# Patient Record
Sex: MALE | Race: WHITE | HISPANIC OR LATINO | Employment: FULL TIME | URBAN - METROPOLITAN AREA
[De-identification: names, ages, dates, MRNs, and addresses within clinical notes are randomized per-mention and may not be internally consistent; named-entity substitution may affect disease eponyms.]

---

## 2019-12-20 ENCOUNTER — OFFICE VISIT (OUTPATIENT)
Dept: URGENT CARE | Facility: CLINIC | Age: 19
End: 2019-12-20
Payer: COMMERCIAL

## 2019-12-20 VITALS
HEART RATE: 104 BPM | HEIGHT: 72 IN | RESPIRATION RATE: 18 BRPM | DIASTOLIC BLOOD PRESSURE: 60 MMHG | BODY MASS INDEX: 19.1 KG/M2 | TEMPERATURE: 99.2 F | WEIGHT: 141 LBS | SYSTOLIC BLOOD PRESSURE: 110 MMHG | OXYGEN SATURATION: 99 %

## 2019-12-20 DIAGNOSIS — T14.8XXA WOUND OF SKIN: ICD-10-CM

## 2019-12-20 PROCEDURE — 99203 OFFICE O/P NEW LOW 30 MIN: CPT | Performed by: NURSE PRACTITIONER

## 2019-12-20 RX ORDER — SULFAMETHOXAZOLE AND TRIMETHOPRIM 800; 160 MG/1; MG/1
1 TABLET ORAL 2 TIMES DAILY
Qty: 14 TAB | Refills: 0 | Status: SHIPPED | OUTPATIENT
Start: 2019-12-20 | End: 2019-12-27

## 2019-12-20 SDOH — HEALTH STABILITY: MENTAL HEALTH: HOW OFTEN DO YOU HAVE A DRINK CONTAINING ALCOHOL?: NEVER

## 2019-12-20 ASSESSMENT — ENCOUNTER SYMPTOMS
FEVER: 0
CHILLS: 0
SHORTNESS OF BREATH: 0

## 2019-12-21 NOTE — PROGRESS NOTES
"Subjective:   Blane Osborne is a 19 y.o. male who presents for Wound Infection (below navel, worsened over past 2 days)        HPI   Patient with new onset wound to the left lower abdomen that started 5 days ago. States he was shaving the area and noticed a \"zit-like\" area which he popped at home. States since then he has noticed a wound has developed and with mild yellow discharge. Mild pain. Denies fever or chills. Has been keeping covered.    Review of Systems   Constitutional: Negative for chills and fever.   Respiratory: Negative for shortness of breath.    Cardiovascular: Negative for chest pain.   Skin: Negative for itching and rash.        Wound of skin     Patient's PMH, SocHx, SurgHx, FamHx, Drug allergies and medications reviewed.     Objective:   /60 (BP Location: Left arm, Patient Position: Sitting, BP Cuff Size: Adult)   Pulse (!) 104   Temp 37.3 °C (99.2 °F) (Temporal)   Resp 18   Ht 1.829 m (6')   Wt 64 kg (141 lb)   SpO2 99%   BMI 19.12 kg/m²   Physical Exam  Vitals signs reviewed.   Constitutional:       General: He is not in acute distress.     Appearance: He is well-developed. He is not diaphoretic.   HENT:      Head: Normocephalic.      Right Ear: Hearing normal.      Left Ear: Hearing normal.      Nose: Nose normal.   Eyes:      General: Lids are normal.      Conjunctiva/sclera: Conjunctivae normal.      Pupils: Pupils are equal, round, and reactive to light.   Neck:      Musculoskeletal: Normal range of motion.   Cardiovascular:      Rate and Rhythm: Normal rate.   Pulmonary:      Effort: Pulmonary effort is normal. No respiratory distress.   Skin:     General: Skin is warm.      Findings: Wound present. No rash.             Comments: Small 0.5cm circular wound to the left lower abdomen with mild surrounding erythema and mild yellow discharge noted. No area of abscess noted or red streaking.   Neurological:      Mental Status: He is alert.   Psychiatric:         Speech: " Speech normal.         Behavior: Behavior normal.         Thought Content: Thought content normal.         Judgment: Judgment normal.           Assessment/Plan:   Assessment    1. Wound of skin  - ANAEROBIC/AEROBIC/GRAM STAIN  - sulfamethoxazole-trimethoprim (BACTRIM DS) 800-160 MG tablet; Take 1 Tab by mouth 2 times a day for 7 days.  Dispense: 14 Tab; Refill: 0    Discussed to keep wound covered with polysporin and bandaid. Discussed signs/symptoms of worsening infection and when to return to the office.  Will call with wound culture results.    Differential diagnosis, natural history, supportive care, and indications for immediate follow-up discussed.     **Please note that all invasive procedures during this visit were performed by myself and/or the Medical Assistant under the supervision of the PA or MD in office**

## 2019-12-26 ENCOUNTER — TELEPHONE (OUTPATIENT)
Dept: URGENT CARE | Facility: CLINIC | Age: 19
End: 2019-12-26

## 2019-12-26 NOTE — TELEPHONE ENCOUNTER
----- Message from ANCELMO Cheek sent at 12/26/2019  1:47 PM PST -----  Regarding: FW: [facility] Results  Could you please call the patient and let him know would culture came back positive for staph and to continue with the antibiotic prescribed.    Thank you,    DAPHNIE Cheek  ----- Message -----  From: Meghann Cortes, Med Ass't  Sent: 12/25/2019   4:07 PM PST  To: ANCELMO Cheek  Subject: [facility]                                Check Media

## 2019-12-26 NOTE — TELEPHONE ENCOUNTER
I left a message asking to return my call regarding a recent visit, to Linda at [facility] #[phone].